# Patient Record
Sex: MALE | Race: WHITE | Employment: OTHER | URBAN - NONMETROPOLITAN AREA
[De-identification: names, ages, dates, MRNs, and addresses within clinical notes are randomized per-mention and may not be internally consistent; named-entity substitution may affect disease eponyms.]

---

## 2023-12-22 ENCOUNTER — HOSPITAL ENCOUNTER (OUTPATIENT)
Age: 63
Discharge: HOME | End: 2023-12-22
Payer: COMMERCIAL

## 2024-12-12 ENCOUNTER — OFFICE VISIT (OUTPATIENT)
Dept: UROLOGY | Age: 64
End: 2024-12-12
Payer: COMMERCIAL

## 2024-12-12 VITALS — HEIGHT: 70 IN | RESPIRATION RATE: 20 BRPM | BODY MASS INDEX: 41.09 KG/M2 | WEIGHT: 287 LBS

## 2024-12-12 DIAGNOSIS — R97.20 ELEVATED PSA: Primary | ICD-10-CM

## 2024-12-12 PROCEDURE — 99203 OFFICE O/P NEW LOW 30 MIN: CPT

## 2024-12-12 RX ORDER — ESCITALOPRAM OXALATE 10 MG/1
10 TABLET ORAL DAILY
COMMUNITY
Start: 2023-12-18

## 2024-12-12 NOTE — PROGRESS NOTES
Cervical back: Normal range of motion and neck supple.   Skin:     General: Skin is warm and dry.   Neurological:      General: No focal deficit present.      Mental Status: He is alert.   Psychiatric:         Mood and Affect: Mood normal.         Behavior: Behavior normal.         Judgment: Judgment normal.         POC  No results found for this visit on 12/12/24.      Ref Range & Units 5/29/24 1528   PSA  0.00 - 0.99 ng/mL 3.19 High      Component  Ref Range & Units 11/6/24   Sodium  mmol/L 138   Potassium  mmol/L 4.3   Chloride  mmol/L 104   Bicarbonate  mmol/L 21   Glucose  mg/dL 100   BUN  mg/dL 14   Creatinine  mg/dL 0.81   eGFR >60   eGFR     Total Protein 8   Albumin  g/dL 4.5   Calcium  mg/dL 9.5   Alkaline Phosphatase  U/L 81   AST  U/L 26   ALT  U/L 39   Total Bilirubin  mg/dL 0.5   BUN/Creatinine Ratio    Globulin    Albumin/Globulin Ratio 1.3   Magnesium    Phosphorus    Uric Acid          Radiology  No updated urologic imaging for review     Assessment:   Elevated PSA  Family hx of prostate cancer     Plan:     Discussed the implications of these results, as well as options moving forward. Patient offered continued PSA checks, ExoDx testing, prostate MRI, and biopsy. The patient understands the risks and benefits of each option and has elected to repeat his PSA          Caren Blake PA-C  Urology

## 2024-12-12 NOTE — PATIENT INSTRUCTIONS
Get PSA  Follow-up with results  Call with questions, comments, or concerns. I recommend going to the ED for further evaluation if you develop fever, chills, nausea, vomiting, chest pain, SOB, or calf pain.    The medication list included in this document is our record of what you are currently taking, including any changes that were made at today's visit.  If you find any differences when compared to your medications at home, or have any questions that were not answered at your visit, please contact the office.

## 2024-12-27 ENCOUNTER — TELEPHONE (OUTPATIENT)
Dept: UROLOGY | Age: 64
End: 2024-12-27

## 2025-01-09 ENCOUNTER — OFFICE VISIT (OUTPATIENT)
Dept: UROLOGY | Age: 65
End: 2025-01-09
Payer: COMMERCIAL

## 2025-01-09 VITALS — HEIGHT: 70 IN | BODY MASS INDEX: 42.52 KG/M2 | RESPIRATION RATE: 20 BRPM | WEIGHT: 297 LBS

## 2025-01-09 DIAGNOSIS — R97.20 ELEVATED PSA: Primary | ICD-10-CM

## 2025-01-09 PROCEDURE — 99213 OFFICE O/P EST LOW 20 MIN: CPT

## 2025-01-09 NOTE — PATIENT INSTRUCTIONS
Follow-up in 6 months with PSA prior  Call with questions, comments, or concerns. I recommend going to the ED for further evaluation if you develop fever, chills, nausea, vomiting, chest pain, SOB, or calf pain.    The medication list included in this document is our record of what you are currently taking, including any changes that were made at today's visit.  If you find any differences when compared to your medications at home, or have any questions that were not answered at your visit, please contact the office.

## 2025-01-09 NOTE — PROGRESS NOTES
[facility] [place] [facility]  [facility] UROLOGY  [address] [facility] [address]  Dept: [phone]  Loc: [phone]    Visit Date: 1/9/2025        HPI:     HPI  Mr. Horner is a 64-year-old male that presents in follow-up.     Pt presents with concerns of elevated PSA. Reports that his uncle passed away with prostate cancer. States that he has been told that he has prostatic enlargement.     Current Outpatient Medications   Medication Sig Dispense Refill    escitalopram (LEXAPRO) 10 MG tablet Take 1 tablet by mouth daily      atorvastatin (LIPITOR) 40 MG tablet Take 10 mg by mouth daily       No current facility-administered medications for this visit.       Past Medical History  Case  has a past medical history of Hyperlipemia.    Past Surgical History  The patient  has a past surgical history that includes Arm Surgery (Left, 1963).    Family History  This patient's family history includes No Known Problems in his father and mother.    Social History  Case  reports that he has never smoked. He does not have any smokeless tobacco history on file. He reports that he does not drink alcohol and does not use drugs.    Subjective  See HPI    Objective:   Resp 20   Ht 1.778 m (5' 10\")   Wt 134.7 kg (297 lb)   BMI 42.62 kg/m²     Physical Exam  Constitutional:       General: He is not in acute distress.     Appearance: Normal appearance. He is not ill-appearing, toxic-appearing or diaphoretic.   HENT:      Head: Normocephalic and atraumatic.      Right Ear: External ear normal.      Left Ear: External ear normal.      Nose: Nose normal.   Eyes:      General:         Right eye: No discharge.         Left eye: No discharge.      Conjunctiva/sclera: Conjunctivae normal.   Pulmonary:      Effort: Pulmonary effort is normal. No respiratory distress.   Musculoskeletal:         General: No deformity or signs of injury.      Cervical back: Normal range of motion and neck supple.   Skin:

## 2025-08-14 ENCOUNTER — OFFICE VISIT (OUTPATIENT)
Dept: UROLOGY | Age: 65
End: 2025-08-14
Payer: COMMERCIAL

## 2025-08-14 VITALS — HEIGHT: 70 IN | WEIGHT: 302 LBS | BODY MASS INDEX: 43.23 KG/M2 | RESPIRATION RATE: 18 BRPM

## 2025-08-14 DIAGNOSIS — R97.20 ELEVATED PSA: Primary | ICD-10-CM

## 2025-08-14 PROCEDURE — 1123F ACP DISCUSS/DSCN MKR DOCD: CPT

## 2025-08-14 PROCEDURE — 99213 OFFICE O/P EST LOW 20 MIN: CPT

## 2025-08-15 ENCOUNTER — TELEPHONE (OUTPATIENT)
Dept: UROLOGY | Age: 65
End: 2025-08-15